# Patient Record
Sex: FEMALE | Race: WHITE | NOT HISPANIC OR LATINO | Employment: FULL TIME | URBAN - METROPOLITAN AREA
[De-identification: names, ages, dates, MRNs, and addresses within clinical notes are randomized per-mention and may not be internally consistent; named-entity substitution may affect disease eponyms.]

---

## 2018-05-28 ENCOUNTER — OFFICE VISIT (OUTPATIENT)
Dept: URGENT CARE | Facility: CLINIC | Age: 48
End: 2018-05-28
Payer: COMMERCIAL

## 2018-05-28 VITALS
SYSTOLIC BLOOD PRESSURE: 118 MMHG | DIASTOLIC BLOOD PRESSURE: 78 MMHG | HEIGHT: 63 IN | OXYGEN SATURATION: 99 % | WEIGHT: 133 LBS | HEART RATE: 83 BPM | BODY MASS INDEX: 23.57 KG/M2 | RESPIRATION RATE: 18 BRPM | TEMPERATURE: 97.6 F

## 2018-05-28 DIAGNOSIS — N39.0 URINARY TRACT INFECTION WITHOUT HEMATURIA, SITE UNSPECIFIED: Primary | ICD-10-CM

## 2018-05-28 LAB
SL AMB  POCT GLUCOSE, UA: ABNORMAL
SL AMB LEUKOCYTE ESTERASE,UA: ABNORMAL
SL AMB POCT BILIRUBIN,UA: ABNORMAL
SL AMB POCT BLOOD,UA: ABNORMAL
SL AMB POCT CLARITY,UA: CLEAR
SL AMB POCT COLOR,UA: YELLOW
SL AMB POCT KETONES,UA: ABNORMAL
SL AMB POCT NITRITE,UA: ABNORMAL
SL AMB POCT PH,UA: 6.5
SL AMB POCT SPECIFIC GRAVITY,UA: 1.05
SL AMB POCT URINE PROTEIN: ABNORMAL
SL AMB POCT UROBILINOGEN: 0.2

## 2018-05-28 PROCEDURE — 81002 URINALYSIS NONAUTO W/O SCOPE: CPT | Performed by: PHYSICIAN ASSISTANT

## 2018-05-28 PROCEDURE — 87086 URINE CULTURE/COLONY COUNT: CPT | Performed by: PHYSICIAN ASSISTANT

## 2018-05-28 PROCEDURE — G0382 LEV 3 HOSP TYPE B ED VISIT: HCPCS | Performed by: PHYSICIAN ASSISTANT

## 2018-05-28 RX ORDER — NITROFURANTOIN 25; 75 MG/1; MG/1
100 CAPSULE ORAL 2 TIMES DAILY
Qty: 14 CAPSULE | Refills: 0 | Status: SHIPPED | OUTPATIENT
Start: 2018-05-28 | End: 2018-06-04

## 2018-05-28 RX ORDER — LEVOTHYROXINE SODIUM 0.1 MG/1
100 TABLET ORAL DAILY
COMMUNITY

## 2018-05-28 NOTE — PROGRESS NOTES
[facility]        NAME: Richard Young is a 50 y o  female  : 1970    MRN: [id number]  DATE: May 28, 2018  TIME: 3:42 PM    Assessment and Plan   Urinary tract infection without hematuria, site unspecified [N39 0]  1  Urinary tract infection without hematuria, site unspecified  Urine culture    POCT urine dip    nitrofurantoin (MACROBID) 100 mg capsule         Patient Instructions     1  Urinary tract infection  -Urine culture is pending- I will call you if your antibiotic needs to be changed  -Take antibiotic as directed  -Increase fluids  -Use tylenol/motrin as needed  -Follow-up with PCP within 5-7 days    Go to ER with worsening symptoms, high fever, flank pain, vomiting, or any signs of distress    Chief Complaint     Chief Complaint   Patient presents with    Urinary Symptoms     onset Am today  - fever, + frequency, burning, pain         History of Present Illness       The patient presents today for an evaluation of urinary burning and urgency/frequency that started this morning  The patient denies taking any medications prior to arrival  She last had a UTI a few years ago and this feels similar  Review of Systems   Review of Systems   Constitutional: Negative for chills and fever  Gastrointestinal: Negative for abdominal pain, nausea and vomiting  Genitourinary: Positive for dysuria, frequency and urgency  Negative for flank pain  Musculoskeletal: Negative for back pain           Current Medications       Current Outpatient Prescriptions:     levothyroxine 100 mcg tablet, Take 100 mcg by mouth daily, Disp: , Rfl:     nitrofurantoin (MACROBID) 100 mg capsule, Take 1 capsule (100 mg total) by mouth 2 (two) times a day for 7 days, Disp: 14 capsule, Rfl: 0    Current Allergies     Allergies as of 2018    (No Known Allergies)            The following portions of the patient's history were reviewed and updated as appropriate: allergies, current medications, past family history, past medical history, past social history, past surgical history and problem list      No past medical history on file  No past surgical history on file  No family history on file  Medications have been verified  Objective   /78   Pulse 83   Temp 97 6 °F (36 4 °C) (Tympanic)   Resp 18   Ht 5' 3" (1 6 m)   Wt 60 3 kg (133 lb)   LMP  (LMP Unknown)   SpO2 99%   BMI 23 56 kg/m²        Physical Exam     Physical Exam   Constitutional: She is oriented to person, place, and time  She appears well-developed and well-nourished  No distress  Cardiovascular: Normal rate, regular rhythm and normal heart sounds  Pulmonary/Chest: Effort normal and breath sounds normal  She has no wheezes  She has no rales  Abdominal: There is no tenderness  There is no CVA tenderness  Neurological: She is alert and oriented to person, place, and time  Skin: Skin is warm and dry  Psychiatric: She has a normal mood and affect  Nursing note and vitals reviewed

## 2018-05-28 NOTE — PATIENT INSTRUCTIONS
1  Urinary tract infection  -Urine culture is pending- I will call you if your antibiotic needs to be changes  -Take antibiotic as directed  -Increase fluids  -Use tylenol/motrin as needed  -Follow-up with PCP within 5-7 days    Go to ER with worsening symptoms, high fever, flank pain, vomiting, or any signs of distress    Urinary Tract Infection in Women   AMBULATORY CARE:   A urinary tract infection (UTI)  is caused by bacteria that get inside your urinary tract  Most bacteria that enter your urinary tract come out when you urinate  If the bacteria stay in your urinary tract, you may get an infection  Your urinary tract includes your kidneys, ureters, bladder, and urethra  Urine is made in your kidneys, and it flows from the ureters to the bladder  Urine leaves the bladder through the urethra  A UTI is more common in your lower urinary tract, which includes your bladder and urethra  Common symptoms include the following:   · Urinating more often or waking from sleep to urinate    · Pain or burning when you urinate    · Pain or pressure in your lower abdomen     · Urine that smells bad    · Blood in your urine    · Leaking urine  Seek care immediately if:   · You are urinating very little or not at all  · You have a high fever with shaking chills  · You have side or back pain that gets worse  Contact your healthcare provider if:   · You have a fever  · You do not feel better after 2 days of taking antibiotics  · You are vomiting  · You have questions or concerns about your condition or care  Treatment for a UTI  may include medicines to treat a bacterial infection  You may also need medicines to decrease pain and burning, or decrease the urge to urinate often  Prevent a UTI:   · Empty your bladder often  Urinate and empty your bladder as soon as you feel the need  Do not hold your urine for long periods of time  · Wipe from front to back after you urinate or have a bowel movement  This will help prevent germs from getting into your urinary tract through your urethra  · Drink liquids as directed  Ask how much liquid to drink each day and which liquids are best for you  You may need to drink more liquids than usual to help flush out the bacteria  Do not drink alcohol, caffeine, or citrus juices  These can irritate your bladder and increase your symptoms  Your healthcare provider may recommend cranberry juice to help prevent a UTI  · Urinate after you have sex  This can help flush out bacteria passed during sex  · Do not douche or use feminine deodorants  These can change the chemical balance in your vagina  · Change sanitary pads or tampons often  This will help prevent germs from getting into your urinary tract  · Do pelvic muscle exercises often  Pelvic muscle exercises may help you start and stop urinating  Strong pelvic muscles may help you empty your bladder easier  Squeeze these muscles tightly for 5 seconds like you are trying to hold back urine  Then relax for 5 seconds  Gradually work up to squeezing for 10 seconds  Do 3 sets of 15 repetitions a day, or as directed  Follow up with your healthcare provider as directed:  Write down your questions so you remember to ask them during your visits  © 2017 [employer] Information is for End User's use only and may not be sold, redistributed or otherwise used for commercial purposes  All illustrations and images included in CareNotes® are the copyrighted property of [employer]  or Augie Rojas  The above information is an  only  It is not intended as medical advice for individual conditions or treatments  Talk to your doctor, nurse or pharmacist before following any medical regimen to see if it is safe and effective for you

## 2018-05-29 LAB — BACTERIA UR CULT: NORMAL

## 2019-08-06 ENCOUNTER — EVALUATION (OUTPATIENT)
Dept: PHYSICAL THERAPY | Age: 49
End: 2019-08-06
Payer: COMMERCIAL

## 2019-08-06 DIAGNOSIS — M25.511 PAIN IN JOINT OF RIGHT SHOULDER: Primary | ICD-10-CM

## 2019-08-06 DIAGNOSIS — M54.2 NECK PAIN: ICD-10-CM

## 2019-08-06 PROCEDURE — 97162 PT EVAL MOD COMPLEX 30 MIN: CPT | Performed by: PHYSICAL THERAPIST

## 2019-08-06 PROCEDURE — 97140 MANUAL THERAPY 1/> REGIONS: CPT | Performed by: PHYSICAL THERAPIST

## 2019-08-06 PROCEDURE — 97014 ELECTRIC STIMULATION THERAPY: CPT | Performed by: PHYSICAL THERAPIST

## 2019-08-06 PROCEDURE — 97110 THERAPEUTIC EXERCISES: CPT | Performed by: PHYSICAL THERAPIST

## 2019-08-06 NOTE — PROGRESS NOTES
PT Evaluation     Today's date: 2019  Patient name: Jc López  : 1970  MRN: [id number]  Referring provider: Ivelisse Delaney MD  Dx:   Encounter Diagnosis     ICD-10-CM    1  Pain in joint of right shoulder M25 511    2  Neck pain M54 2        Start Time: 0700  Stop Time: 0800  Total time in clinic (min): 60 minutes    Assessment  Assessment details: Jc López is a 44 y o  female who presents with pain, decreased strength, decreased ROM and postural  dysfunction  Due to these impairments, Patient has difficulty performing a/iadls  Patient's clinical presentation is consistent with their referring diagnosis of right RTC irritation, and PT diagnosis of neck pain with radiculopathy   Patient would benefit from skilled physical therapy to address their aforementioned impairments, improve their level of function and to improve their overall quality of life  Impairments: abnormal or restricted ROM, activity intolerance, impaired physical strength, lacks appropriate home exercise program, pain with function, poor posture  and poor body mechanics  Understanding of Dx/Px/POC: good   Prognosis: good    Goals  ST-3 WEEKS  1  Decrease pain in right shoulder < 5/10 on VAS at its worst   2   Increase ROM right shoulder by > 5 deg in all deficients planes  3   Increase strength right shoulder > 4-/5    4  Improve postural awareness  LT-6 WEEKS  1  Patient to be independent with a/iadls  2  Increase functional activities for leisure and home activities to previous LOF    3  Independent with HEP and/or fitness program     Plan  Patient would benefit from: skilled physical therapy  Planned modality interventions: cryotherapy, electrical stimulation/Russian stimulation, thermotherapy: hydrocollator packs and unattended electrical stimulation  Planned therapy interventions: activity modification, body mechanics training, aquatic therapy, flexibility, functional ROM exercises, home exercise program, IADL retraining, joint mobilization, manual therapy, neuromuscular re-education, patient education, postural training, strengthening, stretching, therapeutic activities and therapeutic exercise  Frequency: 2-3x week  Duration in weeks: 12  Plan of Care beginning date: 2019  Plan of Care expiration date: 2019  Treatment plan discussed with: patient        Subjective Evaluation    History of Present Illness  Mechanism of injury: Patient reports 2 months ago patient started with pain in right shoulder  She saw PCP had xrays: CA noted  Patient referred to ortho and dx of impingement  Patient seen at PT for one visit but had terrible pain after session  Patient saw a different PCP and an MRI was ordered for her neck and shoulder  Patient saw a different ortho and given a shot and referred to [facility] again  Patient report the injection helped a bit but using her arm increases her pain  Recurrent probem    Pain  Current pain ratin  At best pain ratin  At worst pain rating: 10  Location: right shoulder  Quality: dull ache  Aggravating factors: overhead activity  Progression: no change    Social Support    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)  Hand dominance: right      Diagnostic Tests  X-ray: abnormal  MRI studies: abnormal    FCE comments: MRI of neck patient stated showed HNP, and right shoulder showed tendonitis, AC jt DJD, and impingement  Treatments  Previous treatment: physical therapy and injection treatment  Patient Goals  Patient goals for therapy: decreased pain, increased motion, increased strength, independence with ADLs/IADLs and decreased edema          Objective     Static Posture     Head  Forward  Shoulders  Rounded      Postural Observations  Seated posture: fair  Standing posture: fair  Correction of posture: has no consistent effect        Neurological Testing     Sensation   Cervical/Thoracic     Right   Intact: light touch and hot/cold discrimination    Active Range of Motion Cervical/Thoracic Spine       Cervical    Flexion: 45 degrees   Extension: 70 degrees      Left lateral flexion: 32 degrees      Right lateral flexion: 28 degrees      Left rotation: 48 degrees  Right rotation: 62 degrees         Left Shoulder   Flexion: 160 degrees with pain  Abduction: 158 degrees with pain    Right Shoulder   Flexion: 96 degrees with pain  Abduction: 82 degrees with pain    Passive Range of Motion   Left Shoulder   Flexion: 155 degrees   Abduction: WFL  External rotation 90°: 72 degrees   Internal rotation 90°: 80 degrees     Right Shoulder   Flexion: 145 degrees with pain  Abduction: 115 degrees with pain  External rotation 90°: 65 degrees with pain  Internal rotation 90°: 60 degrees with pain    Strength/Myotome Testing   Cervical Spine   Neck extension: 4  Neck flexion: 4    Left Shoulder     Planes of Motion   Flexion: 4   Extension: 4-   Abduction: 4   Adduction: 4+   External rotation at 0°: 4   Internal rotation at 0°: 4+     Right Shoulder     Planes of Motion   Flexion: 2+   Abduction: 2+   Adduction: 3   External rotation at 0°: 3+   Internal rotation at 0°: 3+     Left Elbow   Flexion: 4+  Extension: 4+    Right Elbow   Flexion: 4  Extension: 4      Flowsheet Rows      Most Recent Value   PT/OT G-Codes   Current Score  41   Projected Score  64             Precautions: thyroid, HNP C/s  Modalities  8/6            MH/ES R shoulder 10                                           Manual  8/6            R shoulder 10'            neck 8'                                                       Exercise Diary  8/6            HEP 10'*                         sm ball closed chain FF/circles 20x                                      Pulleys nv                                                                                                                                                                                                    *Patient instructed in HEP: chin tucks, posture, pendulum and ice

## 2019-08-06 NOTE — LETTER
2019    MD LIANNA Gillis 1  [facility] [address] [place] [address]    Patient: Alexandra Gale   YOB: 1970   Date of Visit: 2019     Encounter Diagnosis     ICD-10-CM    1  Pain in joint of right shoulder M25 511    2  Neck pain M54 2        Dear Dr Gutierrez Simpler: Thank you for your recent referral of Alexandra Gale  Please review the attached evaluation summary from [facility] recent visit  Please verify that you agree with the plan of care by signing the attached order  If you have any questions or concerns, please do not hesitate to call  I sincerely appreciate the opportunity to share in the care of one of your patients and hope to have another opportunity to work with you in the near future  Sincerely,    Reyes Guy, PT      Referring Provider:      I certify that I have read the below Plan of Care and certify the need for these services furnished under this plan of treatment while under my care  MD LIANNA Gillis 1  [facility] [address]: [phone]          PT Evaluation     Today's date: 2019  Patient name: Alexandra Gale  : 1970  MRN: [id number]  Referring provider: Daimen Phillips MD  Dx:   Encounter Diagnosis     ICD-10-CM    1  Pain in joint of right shoulder M25 511                   Assessment  Assessment details: Alexandra Gale is a 44 y o  female who presents with pain, decreased strength, decreased ROM and postural  dysfunction  Due to these impairments, Patient has difficulty performing a/iadls  Patient's clinical presentation is consistent with their referring diagnosis of right RTC irritation, and PT diagnosis of neck pain with radiculopathy    Patient would benefit from skilled physical therapy to address their aforementioned impairments, improve their level of function and to improve their overall quality of life   Impairments: abnormal or restricted ROM, activity intolerance, impaired physical strength, lacks appropriate home exercise program, pain with function, poor posture  and poor body mechanics  Understanding of Dx/Px/POC: good   Prognosis: good    Goals  ST-3 WEEKS  1  Decrease pain in right shoulder < 5/10 on VAS at its worst   2   Increase ROM right shoulder by > 5 deg in all deficients planes  3   Increase strength right shoulder > 4-/5    4  Improve postural awareness  LT-6 WEEKS  1  Patient to be independent with a/iadls  2  Increase functional activities for leisure and home activities to previous LOF  3  Independent with HEP and/or fitness program     Plan  Patient would benefit from: skilled physical therapy  Planned modality interventions: cryotherapy, electrical stimulation/Russian stimulation, thermotherapy: hydrocollator packs and unattended electrical stimulation  Planned therapy interventions: activity modification, body mechanics training, aquatic therapy, flexibility, functional ROM exercises, home exercise program, IADL retraining, joint mobilization, manual therapy, neuromuscular re-education, patient education, postural training, strengthening, stretching, therapeutic activities and therapeutic exercise  Frequency: 2-3x week  Duration in weeks: 12  Plan of Care beginning date: 2019  Plan of Care expiration date: 2019  Treatment plan discussed with: patient        Subjective Evaluation    History of Present Illness  Mechanism of injury: Patient reports 2 months ago patient started with pain in right shoulder  She saw PCP had xrays: CA noted  Patient referred to ortho and dx of impingement  Patient seen at PT for one visit but had terrible pain after session  Patient saw a different PCP and an MRI was ordered for her neck and shoulder  Patient saw a different ortho and given a shot and referred to [facility]   Patient report the injection helped a bit but using her arm increases her pain  Recurrent probem    Pain  Current pain ratin  At best pain ratin  At worst pain rating: 10  Location: right shoulder  Quality: dull ache  Aggravating factors: overhead activity  Progression: no change    Social Support    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)  Hand dominance: right      Diagnostic Tests  X-ray: abnormal  MRI studies: abnormal    FCE comments: MRI of neck patient stated showed HNP, and right shoulder showed tendonitis, AC jt DJD, and impingement  Treatments  Previous treatment: physical therapy and injection treatment  Patient Goals  Patient goals for therapy: decreased pain, increased motion, increased strength, independence with ADLs/IADLs and decreased edema          Objective     Static Posture     Head  Forward  Shoulders  Rounded      Postural Observations  Seated posture: fair  Standing posture: fair  Correction of posture: has no consistent effect        Neurological Testing     Sensation   Cervical/Thoracic     Right   Intact: light touch and hot/cold discrimination    Active Range of Motion   Cervical/Thoracic Spine       Cervical    Flexion: 45 degrees   Extension: 70 degrees      Left lateral flexion: 32 degrees      Right lateral flexion: 28 degrees      Left rotation: 48 degrees  Right rotation: 62 degrees         Left Shoulder   Flexion: 160 degrees with pain  Abduction: 158 degrees with pain    Right Shoulder   Flexion: 96 degrees with pain  Abduction: 82 degrees with pain    Passive Range of Motion   Left Shoulder   Flexion: 155 degrees   Abduction: WFL  External rotation 90°:  72 degrees   Internal rotation 90°:  80 degrees     Right Shoulder   Flexion: 145 degrees with pain  Abduction: 115 degrees with pain  External rotation 90°:  65 degrees with pain  Internal rotation 90°:  60 degrees with pain    Strength/Myotome Testing   Cervical Spine   Neck extension: 4  Neck flexion: 4    Left Shoulder     Planes of Motion   Flexion: 4   Extension: 4- Abduction: 4   Adduction: 4+   External rotation at 0°:  4   Internal rotation at 0°:  4+     Right Shoulder     Planes of Motion   Flexion: 2+   Abduction: 2+   Adduction: 3   External rotation at 0°:  3+   Internal rotation at 0°:  3+     Left Elbow   Flexion: 4+  Extension: 4+    Right Elbow   Flexion: 4  Extension: 4             Precautions: thyroid, HNP C/s  Modalities  8/6            MH/ES R shoulder 10                                           Manual  8/6            R shoulder 10'            neck 8'                                                       Exercise Diary  8/6            HEP 10'*                         sm ball closed chain FF/circles 20x                                      Pulleys nv                                                                                                                                                                                                    *Patient instructed in HEP: chin tucks, posture, pendulum and ice

## 2019-08-07 ENCOUNTER — TRANSCRIBE ORDERS (OUTPATIENT)
Dept: PHYSICAL THERAPY | Age: 49
End: 2019-08-07

## 2019-08-07 DIAGNOSIS — M54.2 NECK PAIN: ICD-10-CM

## 2019-08-07 DIAGNOSIS — M25.511 PAIN IN JOINT OF RIGHT SHOULDER: Primary | ICD-10-CM

## 2019-08-08 ENCOUNTER — OFFICE VISIT (OUTPATIENT)
Dept: PHYSICAL THERAPY | Age: 49
End: 2019-08-08
Payer: COMMERCIAL

## 2019-08-08 DIAGNOSIS — M54.2 NECK PAIN: ICD-10-CM

## 2019-08-08 DIAGNOSIS — M25.511 PAIN IN JOINT OF RIGHT SHOULDER: Primary | ICD-10-CM

## 2019-08-08 PROCEDURE — 97014 ELECTRIC STIMULATION THERAPY: CPT | Performed by: PHYSICAL THERAPIST

## 2019-08-08 PROCEDURE — 97110 THERAPEUTIC EXERCISES: CPT | Performed by: PHYSICAL THERAPIST

## 2019-08-08 PROCEDURE — 97140 MANUAL THERAPY 1/> REGIONS: CPT | Performed by: PHYSICAL THERAPIST

## 2019-08-08 NOTE — PROGRESS NOTES
Daily Note     Today's date: 2019  Patient name: Alan Chi  : 1970  MRN: [id number]  Referring provider: Bibiana Cedillo MD  Dx:   Encounter Diagnosis     ICD-10-CM    1  Pain in joint of right shoulder M25 511    2  Neck pain M54 2        Start Time: 745  Stop Time: 1181  Total time in clinic (min): 50 minutes    Subjective: Patient reports she is feeling better overall, but this morning symptoms down RUE  Patient states she is trying to watch her posture  Objective: See treatment diary below      Assessment: Tolerated treatment well  Patient would benefit from continued PT Less guarding with RUE ROM and stretching  Patient felt better after session  Advised to ice and continue with HEP  Increased PROM right shoulder today  Plan: Continue per plan of care  Precautions: thyroid, HNP C/s  Modalities             MH/ES R shoulder 10 10                                          Manual             R shoulder 10' 10'            neck 8' 10                                                      Exercise Diary             HEP 10'*                         sm ball closed chain FF/circles 20x 20x           Chin tucks  15x                        Pulleys nv 20x           Ball isometrics shoulder x6  10xea           Ladder RUE  3x                                                                                                                                                                         *Patient instructed in HEP: chin tucks, posture, pendulum and ice 
Detail Level: Detailed

## 2019-08-12 ENCOUNTER — OFFICE VISIT (OUTPATIENT)
Dept: PHYSICAL THERAPY | Age: 49
End: 2019-08-12
Payer: COMMERCIAL

## 2019-08-12 DIAGNOSIS — M25.511 PAIN IN JOINT OF RIGHT SHOULDER: Primary | ICD-10-CM

## 2019-08-12 DIAGNOSIS — M54.2 NECK PAIN: ICD-10-CM

## 2019-08-12 PROCEDURE — 97140 MANUAL THERAPY 1/> REGIONS: CPT | Performed by: PHYSICAL THERAPIST

## 2019-08-12 PROCEDURE — 97110 THERAPEUTIC EXERCISES: CPT | Performed by: PHYSICAL THERAPIST

## 2019-08-12 PROCEDURE — 97014 ELECTRIC STIMULATION THERAPY: CPT | Performed by: PHYSICAL THERAPIST

## 2019-08-12 NOTE — PROGRESS NOTES
Daily Note     Today's date: 2019  Patient name: Zachary Us  : 1970  MRN: [id number]  Referring provider: Bobby Webster MD  Dx:   Encounter Diagnosis     ICD-10-CM    1  Pain in joint of right shoulder M25 511    2  Neck pain M54 2                   Subjective: Patient c/o pinching anterior right shoulder, c/o numbness in right hand upon waking  Able to lift her arm higher in front but not out to the side  Objective: See treatment diary below      Assessment: Tolerated treatment well  Patient would benefit from continued PT ROM right shoulder is doing well  Plan: Continue per plan of care  Precautions: thyroid, HNP C/s  Modalities            MH/ES R shoulder 10 10 10'                                         Manual            R shoulder 10' 10'  10          neck 8' 10 10                                                     Exercise Diary            HEP 10'*                         Cane FF   20x          Cane CP   20x          Shoulder stab circles   1# 20xea          sm ball closed chain FF/circles 20x 20x 30x          Chin tucks  15x 20x                       Pulleys nv 20x 30x          Ball isometrics shoulder x6  10xea 15xea          Ladder RUE  3x 3x          UBE   4'          Rows   25# 20x                                                                                                                                              *Patient instructed in HEP: chin tucks, posture, pendulum and ice

## 2019-08-15 ENCOUNTER — OFFICE VISIT (OUTPATIENT)
Dept: PHYSICAL THERAPY | Age: 49
End: 2019-08-15
Payer: COMMERCIAL

## 2019-08-15 DIAGNOSIS — M25.511 PAIN IN JOINT OF RIGHT SHOULDER: Primary | ICD-10-CM

## 2019-08-15 DIAGNOSIS — M54.2 NECK PAIN: ICD-10-CM

## 2019-08-15 PROCEDURE — 97110 THERAPEUTIC EXERCISES: CPT | Performed by: PHYSICAL THERAPIST

## 2019-08-15 PROCEDURE — 97014 ELECTRIC STIMULATION THERAPY: CPT | Performed by: PHYSICAL THERAPIST

## 2019-08-15 PROCEDURE — 97140 MANUAL THERAPY 1/> REGIONS: CPT | Performed by: PHYSICAL THERAPIST

## 2019-08-15 NOTE — PROGRESS NOTES
Daily Note     Today's date: 8/15/2019  Patient name: Yecenia Pierre  : 1970  MRN: [id number]  Referring provider: Lisadnro Duarte MD  Dx:   Encounter Diagnosis     ICD-10-CM    1  Pain in joint of right shoulder M25 511    2  Neck pain M54 2        Start Time: 845  Stop Time: 0940  Total time in clinic (min): 55 minutes    Subjective: Patient reports she is getting a pinching in front of right shoulder, and wakes up with her right hand numb  Patient to see MD tomorrow  Objective: See treatment diary below      Assessment: Tolerated treatment well  Patient would benefit from continued PT Patient able to lift her right arm higher and PROM is farther before the pain  Patient making good gains in PT  Possibly try Kinesiotape to anterior right shoulder next visit  Plan: Continue per plan of care  Precautions: thyroid, HNP C/s  Modalities  8/6 8/8 8/12 8/15         MH/ES R shoulder 10 10 10' 10                                        Manual  8/6 8/8 8/12 8/15         R shoulder 10' 10'  10 10         neck 8' 10 10 10                      NM re-ed    nv? Exercise Diary  8/6 8/8 8/12 8/15         HEP 10'*                         Cane FF   20x 30x         Cane CP   20x 30x         Shoulder stab circles   1# 20xea 2# 30x         sm ball closed chain FF/circles 20x 20x 30x lg ball 30x         Chin tucks  15x 20x home                      Pulleys nv 20x 30x 30x         Ball isometrics shoulder x6  10xea 15xea 20x ea         Ladder RUE  3x 3x 3x         UBE   4' 4'         Rows   25# 20x 25# 30x                      Neck concha    nv? *Patient instructed in HEP: chin tucks, posture, pendulum and ice

## 2019-08-19 ENCOUNTER — OFFICE VISIT (OUTPATIENT)
Dept: PHYSICAL THERAPY | Age: 49
End: 2019-08-19
Payer: COMMERCIAL

## 2019-08-19 DIAGNOSIS — M25.511 PAIN IN JOINT OF RIGHT SHOULDER: Primary | ICD-10-CM

## 2019-08-19 DIAGNOSIS — M54.2 NECK PAIN: ICD-10-CM

## 2019-08-19 PROCEDURE — 97112 NEUROMUSCULAR REEDUCATION: CPT | Performed by: PHYSICAL THERAPIST

## 2019-08-19 PROCEDURE — 97140 MANUAL THERAPY 1/> REGIONS: CPT | Performed by: PHYSICAL THERAPIST

## 2019-08-19 PROCEDURE — 97014 ELECTRIC STIMULATION THERAPY: CPT | Performed by: PHYSICAL THERAPIST

## 2019-08-19 PROCEDURE — 97110 THERAPEUTIC EXERCISES: CPT | Performed by: PHYSICAL THERAPIST

## 2019-08-19 NOTE — PROGRESS NOTES
Daily Note     Today's date: 2019  Patient name: Carmen Reyes  : 1970  MRN: [id number]  Referring provider: Mark Huddleston MD  Dx:   Encounter Diagnosis     ICD-10-CM    1  Pain in joint of right shoulder M25 511    2  Neck pain M54 2        Start Time: 910  Stop Time: 1020  Total time in clinic (min): 70 minutes    Subjective: Patient reports she saw MD last week and he wants to do 2 more weeks of PT and continue with anti inflammatories  Patient reports she gets the numbness in both hands at times, usually when sleeping  Objective: See treatment diary below      Assessment: Tolerated treatment well  Patient would benefit from continued PT Patient is making good gains in PT  Advised a contour pillow for sleeping, postural brace to wear, continue with ice and exercises  Added isometric neck extension to HEP  Plan: Continue per plan of care  Precautions: thyroid, HNP C/s  Modalities  8/6 8/8 8/12 8/15 8/19        MH/ES R shoulder 10 10 10' 10 10                                       Manual  8/6 8/8 8/12 8/15 8/19        R shoulder 10' 10'  10 10 10        neck 8' 10 10 10 10                     NM re-ed/kinesio -tape    nv?  Bicep 8'                         Exercise Diary  8/6 8/8 8/12 8/15 8/19        HEP 10'*                         Cane FF   20x 30x 1# 30x        Cane CP   20x 30x 1# 30x        Shoulder stab circles   1# 20xea 2# 30x 2# 30x        sm ball closed chain FF/circles 20x 20x 30x lg ball 30x 30x        Chin tucks  15x 20x home                      Pulleys nv 20x 30x 30x 30x        Ball isometrics shoulder x6  10xea 15xea 20x ea D/c to RTC        Ladder RUE  3x 3x 3x 3x        UBE   4' 4' 4'        Rows   25# 20x 25# 30x 25# 30x        RTC     20x ea        Neck concha    nv? extension 10x        triceps     35# 30x                                                                                                     *Patient instructed in HEP: chin tucks, posture, pendulum and ice

## 2019-08-22 ENCOUNTER — OFFICE VISIT (OUTPATIENT)
Dept: PHYSICAL THERAPY | Age: 49
End: 2019-08-22
Payer: COMMERCIAL

## 2019-08-22 DIAGNOSIS — M54.2 NECK PAIN: ICD-10-CM

## 2019-08-22 DIAGNOSIS — M25.511 PAIN IN JOINT OF RIGHT SHOULDER: Primary | ICD-10-CM

## 2019-08-22 PROCEDURE — 97112 NEUROMUSCULAR REEDUCATION: CPT | Performed by: PHYSICAL THERAPIST

## 2019-08-22 PROCEDURE — 97110 THERAPEUTIC EXERCISES: CPT | Performed by: PHYSICAL THERAPIST

## 2019-08-22 PROCEDURE — 97014 ELECTRIC STIMULATION THERAPY: CPT | Performed by: PHYSICAL THERAPIST

## 2019-08-22 PROCEDURE — 97140 MANUAL THERAPY 1/> REGIONS: CPT | Performed by: PHYSICAL THERAPIST

## 2019-08-22 NOTE — PROGRESS NOTES
Daily Note     Today's date: 2019  Patient name: Laxmi Lu  : 1970  MRN: [id number]  Referring provider: Junior Cantu MD  Dx:   Encounter Diagnosis     ICD-10-CM    1  Pain in joint of right shoulder M25 511    2  Neck pain M54 2        Start Time: 1010  Stop Time: 1120  Total time in clinic (min): 70 minutes    Subjective: Patient reports she is sore in upper back, patient also stated the tape helped her shoulder  Objective: See treatment diary below      Assessment: Tolerated treatment well  Patient would benefit from continued PT Pain end range with decreased full ROM right shoulder Flexion, ER and IR  Patient sleeping with contour pillow and less numbness in hands in the morning  Plan: Continue per plan of care  Precautions: thyroid, HNP C/s  Modalities  8/6 8/8 8/12 8/15 8/19 8/22       MH/ES R shoulder 10 10 10' 10 10 10                                      Manual  8/6 8/8 8/12 8/15 8/19 8/22       R shoulder 10' 10'  10 10 10 10       neck 8' 10 10 10 10 10                    NM re-ed/kinesio -tape    nv? Bicep 8 bicep                        Exercise Diary  8/6 8/8 8/12 8/15 8/19 8/22       HEP 10'*            CP+      1# 30x       Cane FF   20x 30x 1# 30x 1# 30x       Cane CP   20x 30x 1# 30x 1# 30x       Shoulder stab circles   1# 20xea 2# 30x 2# 30x 2# 30x       sm ball closed chain FF/circles 20x 20x 30x lg ball 30x 30x 30xea       Chin tucks  15x 20x home                      Pulleys nv 20x 30x 30x 30x 30x       Ball isometrics shoulder x6  10xea 15xea 20x ea D/c to RTC        Ladder RUE  3x 3x 3x 3x 3x       UBE   4' 4' 4' 4'       Rows   25# 20x 25# 30x 25# 30x 25# 30x       RTC     20x ea 30x       Neck concha    nv? extension 10x home       triceps     35# 30x 35# 30x                                                                                                    *Patient instructed in HEP: chin tucks, posture, pendulum and ice

## 2019-08-26 ENCOUNTER — OFFICE VISIT (OUTPATIENT)
Dept: PHYSICAL THERAPY | Age: 49
End: 2019-08-26
Payer: COMMERCIAL

## 2019-08-26 DIAGNOSIS — M25.511 PAIN IN JOINT OF RIGHT SHOULDER: Primary | ICD-10-CM

## 2019-08-26 DIAGNOSIS — M54.2 NECK PAIN: ICD-10-CM

## 2019-08-26 PROCEDURE — 97110 THERAPEUTIC EXERCISES: CPT | Performed by: PHYSICAL THERAPIST

## 2019-08-26 PROCEDURE — 97140 MANUAL THERAPY 1/> REGIONS: CPT | Performed by: PHYSICAL THERAPIST

## 2019-08-26 PROCEDURE — 97014 ELECTRIC STIMULATION THERAPY: CPT | Performed by: PHYSICAL THERAPIST

## 2019-08-26 PROCEDURE — 97112 NEUROMUSCULAR REEDUCATION: CPT | Performed by: PHYSICAL THERAPIST

## 2019-08-26 NOTE — PROGRESS NOTES
Daily Note     Today's date: 2019  Patient name: Hannah De  : 1970  MRN: [id number]  Referring provider: Kd Amato MD  Dx:   Encounter Diagnosis     ICD-10-CM    1  Pain in joint of right shoulder M25 511    2  Neck pain M54 2        Start Time: 815  Stop Time: 915  Total time in clinic (min): 60 minutes    Subjective: Patient reports she had a bad weekend, she feels she is going backwards  Patient pulled some weeds but that was all  Wakes up with numbness and has weakness in RUE during the day  Objective: See treatment diary below      Assessment: Tolerated treatment fair  Patient would benefit from continued PT Patient able to stretch RUE farther if she performs a chin tuck while stretching  Advised patient to continue to ice and to perform chin tucks  Plan: Continue per plan of care  Precautions: thyroid, HNP C/s  Modalities  8/6 8/8 8/12 8/15 8/19 8/22 8/26      MH/ES R shoulder 10 10 10' 10 10 10 10                                     Manual  8/6 8/8 8/12 8/15 8/19 8/22 8/26      R shoulder 10' 8'  10 10 10 10 10      neck 8' 10 10 10 10 10 10                   NM re-ed/kinesio -tape    nv?  Bicep 8 bicep 8 bicep & impingment                       Exercise Diary  8/6 8/8 8/12 8/15 8/19 8/22 8/26      HEP 10'*            CP+      1# 30x 1# 30x      Cane FF   20x 30x 1# 30x 1# 30x 1# 30x      Cane CP   20x 30x 1# 30x 1# 30x 1# 30x      Shoulder stab circles   1# 20xea 2# 30x 2# 30x 2# 30x 2# 30x      sm ball closed chain FF/circles 20x 20x 30x lg ball 30x 30x 30xea 30x      Chin tucks  15x 20x home                      Pulleys nv 20x 30x 30x 30x 30x 30x                   Ladder RUE  3x 3x 3x 3x 3x 3x      UBE   4' 4' 4' 4' 4'      Rows   25# 20x 25# 30x 25# 30x 25# 30x 25# 30x      RTC     20x ea 30x 30x      Neck concha    nv? extension 10x home       triceps     35# 30x 35# 30x 35# 30x *Patient instructed in HEP: chin tucks, posture, pendulum and ice

## 2019-08-29 ENCOUNTER — OFFICE VISIT (OUTPATIENT)
Dept: PHYSICAL THERAPY | Age: 49
End: 2019-08-29
Payer: COMMERCIAL

## 2019-08-29 DIAGNOSIS — M25.511 PAIN IN JOINT OF RIGHT SHOULDER: Primary | ICD-10-CM

## 2019-08-29 DIAGNOSIS — M54.2 NECK PAIN: ICD-10-CM

## 2019-08-29 PROCEDURE — 97014 ELECTRIC STIMULATION THERAPY: CPT | Performed by: PHYSICAL THERAPIST

## 2019-08-29 PROCEDURE — 97110 THERAPEUTIC EXERCISES: CPT | Performed by: PHYSICAL THERAPIST

## 2019-08-29 PROCEDURE — 97140 MANUAL THERAPY 1/> REGIONS: CPT | Performed by: PHYSICAL THERAPIST

## 2019-08-29 NOTE — PROGRESS NOTES
Daily Note     Today's date: 2019  Patient name: Sylvain Wade  : 1970  MRN: [id number]  Referring provider: Dionna Lawler MD  Dx:   Encounter Diagnosis     ICD-10-CM    1  Pain in joint of right shoulder M25 511    2  Neck pain M54 2        Start Time: 0700  Stop Time: 0745  Total time in clinic (min): 45 minutes    Subjective: Patient reports she is "going backwards"  Patient stated pain down right arm with decreased ROM  No numbness in hands anymore however  Tape helped a little she feels  Patient to see MD tomorrow for shoulder  Patient states she has an appt with pain management at [facility] next week  Patient feels upset that she isn't better  Objective: See treatment diary below      Assessment: Tolerated treatment fair  Patient has pain with guarding at end range right shoulder all motions  PROM right shoulder Flexion 130, abd 120, ER 65, IR 75  Patient has rounder shoulders and forward head with small dowager's hump  Patient is working on her posture with cues and exercises at home  Patient was doing better until this week  She has follow up with Ortho tomorrow  Patient has large copayments and is not currently working  Plan: Hold PT until after MD appts  Precautions: thyroid, HNP C/s  Modalities  8/6 8/8 8/12 8/15 8/19 8/22 8/26 8/29     MH/ES R shoulder 10 10 10' 10 10 10 10 10'                                    Manual  8/6 8/8 8/12 8/15 8/19 8/22 8/26 8/29     R shoulder 10' 10'  10 10 10 10 10 10     neck 8' 10 10 10 10 10 10 10                  NM re-ed/kinesio -tape    nv?  Bicep 8' bicep 8' bicep & impingment nt                      Exercise Diary  8/6 8/8 8/12 8/15 8/19 8/22 8/26 8/29     HEP 10'*            CP+      1# 30x 1# 30x 30x     Cane FF   20x 30x 1# 30x 1# 30x 1# 30x 30x     Cane CP   20x 30x 1# 30x 1# 30x 1# 30x 30x     Shoulder stab circles   1# 20xea 2# 30x 2# 30x 2# 30x 2# 30x 30x     sm ball closed chain FF/circles 20x 20x 30x lg ball 30x 30x 30xea 30x 30x Chin tucks  15x 20x home                      Pulleys nv 20x 30x 30x 30x 30x 30x nt                  Ladder RUE  3x 3x 3x 3x 3x 3x nt     UBE   4' 4' 4' 4' 4' nt     Rows   25# 20x 25# 30x 25# 30x 25# 30x 25# 30x nt     RTC     20x ea 30x 30x nt     Neck concha    nv? extension 10x home  nt     triceps     35# 30x 35# 30x 35# 30x nt                                                                                                  *Patient instructed in HEP: chin tucks, posture, pendulum and ice

## 2019-09-17 NOTE — PROGRESS NOTES
Spoke with patient today  She stated she was told by ortho to stop therapy  She saw a neurologist and having surgery on Monday for disc replacement in her neck  D/c PT at this time

## 2020-01-22 ENCOUNTER — EVALUATION (OUTPATIENT)
Dept: PHYSICAL THERAPY | Age: 50
End: 2020-01-22
Payer: COMMERCIAL

## 2020-01-22 DIAGNOSIS — G89.29 CHRONIC RIGHT SHOULDER PAIN: Primary | ICD-10-CM

## 2020-01-22 DIAGNOSIS — M25.511 CHRONIC RIGHT SHOULDER PAIN: Primary | ICD-10-CM

## 2020-01-22 PROCEDURE — 97140 MANUAL THERAPY 1/> REGIONS: CPT | Performed by: PHYSICAL THERAPIST

## 2020-01-22 PROCEDURE — 97110 THERAPEUTIC EXERCISES: CPT | Performed by: PHYSICAL THERAPIST

## 2020-01-22 PROCEDURE — 97162 PT EVAL MOD COMPLEX 30 MIN: CPT | Performed by: PHYSICAL THERAPIST

## 2020-01-22 NOTE — PROGRESS NOTES
PT Evaluation     Today's date: 2020  Patient name: Jayce Damon  : 1970  MRN: [id number]  Referring provider: Mckinley Pineda MD  Dx:   Encounter Diagnosis     ICD-10-CM    1  Chronic right shoulder pain M25 511     G89 29                   Assessment  Assessment details: Jayce Damon is a 44 y o  female who presents with right shoulder pain, decreased right UE strength, decreased A/PROM and decreased joint mobility  Due to these impairments, Patient has difficulty performing a/iadls and recreational activities  Patient's clinical presentation is consistent with the referring diagnosis of right shoulder pain and adhesive capsulitis s/p manipulation under anesthesia  Patient would benefit from skilled physical therapy to address the aforementioned impairments, improve her level of function and to improve her overall quality of life  Impairments: abnormal or restricted ROM, activity intolerance, impaired physical strength, lacks appropriate home exercise program and pain with function  Functional limitations: can not use right arm for functional activities, immoblized in sling due to nerve block, pt is right handedUnderstanding of Dx/Px/POC: good   Prognosis: good    Goals  ST-3 WEEKS  1  Decrease pain to < 5/10 at its worst on VAS  2   Increase ROM of the right shoulder equivalent to left  3   Increase UE by 1/2 MMT grade in all deficient planes  4  Independent HEP for daily performance  LT-6 WEEKS  1  Patient to be independent with adls using right hand including combing her hair and self cleansing/toileting  2  Full A/PROM of the right shoulder  3  Strength 4/5 or greater      Plan  Plan details: Due to large copay pt will be seen 5x/week but 2 visits will be NC   Patient would benefit from: skilled physical therapy  Planned modality interventions: cryotherapy, thermotherapy: hydrocollator packs and unattended electrical stimulation  Planned therapy interventions: functional ROM exercises, home exercise program, joint mobilization, manual therapy, neuromuscular re-education, patient education, strengthening, stretching, therapeutic activities and therapeutic exercise  Frequency: 5x/week for 2 weeks, then 2-3x week  Duration in weeks: 8  Plan of Care beginning date: 2020  Plan of Care expiration date: 2020  Treatment plan discussed with: patient        Subjective Evaluation    History of Present Illness  Mechanism of injury: Pt had right shoulder pain for several months but became more constant 2019  She had prior PT without relief  Pt was then referred to a neurosurgeon who felt she needed a cervical disc replacement  She underwent surgery   Post surgery no relief for right shoulder pain  She was then referred to Dr Isaak Raya for a second opinion, He felt hte pain ws being caused by her shoulder  She then saw Dr Nelly Fuller who diagnosed her with adhesive capsulitis  She underwent manipulation or the right shoulder today  She is to have PT daily for 2 weeks and then returns to Dr Nelly Fuller     Pain  Current pain ratin (nerve block present)  At worst pain ratin  Location: right sholder radiates to elbow  Quality: radiating and sharp    Hand dominance: right    Treatments  Previous treatment: physical therapy  Patient Goals  Patient goals for therapy: decreased pain, increased motion, increased strength and independence with ADLs/IADLs  Patient goal: increase        Objective     Observations     Additional Observation Details  Immobilized in sling    Neurological Testing     Additional Neurological Details  Pt is completely numb today during evaluation due to nerve block present    Passive Range of Motion   Left Shoulder   Flexion: 176 degrees   Abduction: 175 degrees   External rotation 90°: 87 degrees   Internal rotation 90°: 62 degrees     Right Shoulder   Flexion: 175 degrees   Abduction: 172 degrees   External rotation 90°: 90 degrees   Internal rotation 90°: 69 degrees     Strength/Myotome Testing     Left Shoulder   Normal muscle strength    Additional Strength Details  Right shoulder not assessed due to nerve block    Shoulder shrug 3/5    General Comments:      Shoulder Comments   Thumb in IP flexion with possible trigger, able to move passively to neutral with popping but due to nerve block with return to IP flexed postion             Precautions: standard      Manual  1/22            Right shoulder, UE 15                                                                    Exercise Diary  1/22            Pendulums CW/CCW 10x ea            Cane FF 10x assist  hold cane            Cane ABD 10x A            Cane ER 10x A            Ball closed chain FF with trunk lean 10x  blue            Ball closed chain circles CW/CCW 10xea blue                         puleys FF/POS 10x ea                                                                             HEP/ pt ed 5'                                                                                          HEP- pendulums, cane FF/ABD/ER, pulleys, table slides 3-5x day     Modalities  1/22            Ice post 10'            Ice/MH ES prn NT

## 2020-01-22 NOTE — LETTER
2020    [facility] [address] [facility]    Patient: German Dawkins   YOB: 1970   Date of Visit: 2020     Encounter Diagnosis     ICD-10-CM    1  Chronic right shoulder pain M25 511     G89 29        Dear Dr Ten Izaguirre:    Thank you for your recent referral of German Dawkins  Please review the attached evaluation summary from [facility] recent visit  Please verify that you agree with the plan of care by signing the attached order  If you have any questions or concerns, please do not hesitate to call  I sincerely appreciate the opportunity to share in the care of one of your patients and hope to have another opportunity to work with you in the near future  Sincerely,    Lawyer Sierra, PT      Referring Provider:      I certify that I have read the below Plan of Care and certify the need for these services furnished under this plan of treatment while under my care  Donny Arceo MD  [facility] 31: [phone]          PT Evaluation     Today's date: 2020  Patient name: German Dawkins  : 1970  MRN: [id number]  Referring provider: Travis Zambrano MD  Dx:   Encounter Diagnosis     ICD-10-CM    1  Chronic right shoulder pain M25 511     G89 29                   Assessment  Assessment details: German Dawkins is a 44 y o  female who presents with right shoulder pain, decreased right UE strength, decreased A/PROM and decreased joint mobility  Due to these impairments, Patient has difficulty performing a/iadls and recreational activities  Patient's clinical presentation is consistent with the referring diagnosis of right shoulder pain and adhesive capsulitis s/p manipulation under anesthesia   Patient would benefit from skilled physical therapy to address the aforementioned impairments, improve her level of function and to improve her overall quality of life   Impairments: abnormal or restricted ROM, activity intolerance, impaired physical strength, lacks appropriate home exercise program and pain with function  Functional limitations: can not use right arm for functional activities, immoblized in sling due to nerve block, pt is right handedUnderstanding of Dx/Px/POC: good   Prognosis: good    Goals  ST-3 WEEKS  1  Decrease pain to < 5/10 at its worst on VAS  2   Increase ROM of the right shoulder equivalent to left  3   Increase UE by 1/2 MMT grade in all deficient planes  4  Independent HEP for daily performance  LT-6 WEEKS  1  Patient to be independent with adls using right hand including combing her hair and self cleansing/toileting  2  Full A/PROM of the right shoulder  3  Strength 4/5 or greater  Plan  Plan details: Due to large copay pt will be seen 5x/week but 2 visits will be NC   Patient would benefit from: skilled physical therapy  Planned modality interventions: cryotherapy, thermotherapy: hydrocollator packs and unattended electrical stimulation  Planned therapy interventions: functional ROM exercises, home exercise program, joint mobilization, manual therapy, neuromuscular re-education, patient education, strengthening, stretching, therapeutic activities and therapeutic exercise  Frequency: 5x/week for 2 weeks, then 2-3x week  Duration in weeks: 8  Plan of Care beginning date: 2020  Plan of Care expiration date: 2020  Treatment plan discussed with: patient        Subjective Evaluation    History of Present Illness  Mechanism of injury: Pt had right shoulder pain for several months but became more constant 2019  She had prior PT without relief  Pt was then referred to a neurosurgeon who felt she needed a cervical disc replacement  She underwent surgery   Post surgery no relief for right shoulder pain  She was then referred to Dr Isaak Raya for a second opinion, He felt hte pain ws being caused by her shoulder   She then saw Dr Sonu Pederson who diagnosed her with adhesive capsulitis  She underwent manipulation or the right shoulder today  She is to have PT daily for 2 weeks and then returns to Dr Sonu Pederson     Pain  Current pain ratin (nerve block present)  At worst pain ratin  Location: right sholder radiates to elbow  Quality: radiating and sharp    Hand dominance: right    Treatments  Previous treatment: physical therapy  Patient Goals  Patient goals for therapy: decreased pain, increased motion, increased strength and independence with ADLs/IADLs  Patient goal: increase        Objective     Observations     Additional Observation Details  Immobilized in sling    Neurological Testing     Additional Neurological Details  Pt is completely numb today during evaluation due to nerve block present    Passive Range of Motion   Left Shoulder   Flexion: 176 degrees   Abduction: 175 degrees   External rotation 90°:  87 degrees   Internal rotation 90°:  62 degrees     Right Shoulder   Flexion: 175 degrees   Abduction: 172 degrees   External rotation 90°:  90 degrees   Internal rotation 90°:  69 degrees     Strength/Myotome Testing     Left Shoulder   Normal muscle strength    Additional Strength Details  Right shoulder not assessed due to nerve block    Shoulder shrug 3/5    General Comments:      Shoulder Comments   Thumb in IP flexion with possible trigger, able to move passively to neutral with popping but due to nerve block with return to IP flexed postion             Precautions: standard      Manual              Right shoulder, UE 15                                                                    Exercise Diary              Pendulums CW/CCW 10x ea            Cane FF 10x assist  hold cane            Cane ABD 10x A            Cane ER 10x A            Ball closed chain FF with trunk lean 10x  blue            Ball closed chain circles CW/CCW 10xea blue                         puleys FF/POS 10x ea HEP/ pt ed 5'                                                                                          HEP- pendulums, cane FF/ABD/ER, pulleys, table slides 3-5x day     Modalities  1/22            Ice post 10'            Ice/MH ES prn NT

## 2020-01-23 ENCOUNTER — OFFICE VISIT (OUTPATIENT)
Dept: PHYSICAL THERAPY | Age: 50
End: 2020-01-23
Payer: COMMERCIAL

## 2020-01-23 ENCOUNTER — TRANSCRIBE ORDERS (OUTPATIENT)
Dept: PHYSICAL THERAPY | Age: 50
End: 2020-01-23

## 2020-01-23 DIAGNOSIS — M25.511 CHRONIC RIGHT SHOULDER PAIN: Primary | ICD-10-CM

## 2020-01-23 DIAGNOSIS — M25.511 RIGHT SHOULDER PAIN, UNSPECIFIED CHRONICITY: Primary | ICD-10-CM

## 2020-01-23 DIAGNOSIS — M25.511 PAIN IN JOINT OF RIGHT SHOULDER: ICD-10-CM

## 2020-01-23 DIAGNOSIS — G89.29 CHRONIC RIGHT SHOULDER PAIN: Primary | ICD-10-CM

## 2020-01-23 PROCEDURE — 97014 ELECTRIC STIMULATION THERAPY: CPT | Performed by: PHYSICAL THERAPIST

## 2020-01-23 PROCEDURE — 97140 MANUAL THERAPY 1/> REGIONS: CPT | Performed by: PHYSICAL THERAPIST

## 2020-01-23 PROCEDURE — 97110 THERAPEUTIC EXERCISES: CPT | Performed by: PHYSICAL THERAPIST

## 2020-01-23 NOTE — PROGRESS NOTES
Daily Note     Today's date: 2020  Patient name: Mariela Sanders  : 1970  MRN: [id number]  Referring provider: Tammy Holman MD  Dx:   Encounter Diagnosis     ICD-10-CM    1  Chronic right shoulder pain M25 511     G89 29    2  Pain in joint of right shoulder M25 511        Start Time: 1430  Stop Time: 1530  Total time in clinic (min): 60 minutes    Subjective: Patient reports the pain medication is not really working for her  Patient did not take anything prior to PT today due to had to drive herself  C/o pain 5/10  Patient states she feels like she needs the support of the sling  Objective: See treatment diary below      Assessment: Tolerated treatment fair  Patient would benefit from continued PT Patient wearing sling  Advised patient remove and only wear if needed  Instructed patient in HEP in sitting, supine and standing for PROM, AAROM  Patient needed support to lift RUE, c/o pain with stretching end range but loose end feel  Weakness right shld and scapular girdle  PROM FF: 160, abd (POS) 170, true abd 140, ER @90: 56, IR @ 90: 75      Plan: Continue per plan of care        Precautions: standard      Manual             Right shoulder, UE 15 20'                                                                   Exercise Diary             Pendulums CW/CCW 10x ea 20x           Cane FF 10x assist  hold cane 20x A           Cane ABD 10x A 20x A           Cane ER 10x A 20x           Cane CP  20x A                        Ball closed chain FF with trunk lean 10x  blue 30x           Ball closed chain circles CW/CCW 10xea blue 30x                        puleys FF/POS 10x ea 30x                                                                            HEP/ pt ed 5' 10'                                                                                         HEP- pendulums, cane FF/ABD/ER, pulleys, table slides 3-5x day     Modalities             Ice post 10' @ home Ice/DANA ES prn NT 10'

## 2020-01-24 ENCOUNTER — OFFICE VISIT (OUTPATIENT)
Dept: PHYSICAL THERAPY | Age: 50
End: 2020-01-24
Payer: COMMERCIAL

## 2020-01-24 DIAGNOSIS — G89.29 CHRONIC RIGHT SHOULDER PAIN: Primary | ICD-10-CM

## 2020-01-24 DIAGNOSIS — M25.511 CHRONIC RIGHT SHOULDER PAIN: Primary | ICD-10-CM

## 2020-01-24 PROCEDURE — 97110 THERAPEUTIC EXERCISES: CPT | Performed by: PHYSICAL THERAPIST

## 2020-01-24 PROCEDURE — 97014 ELECTRIC STIMULATION THERAPY: CPT | Performed by: PHYSICAL THERAPIST

## 2020-01-24 PROCEDURE — 97140 MANUAL THERAPY 1/> REGIONS: CPT | Performed by: PHYSICAL THERAPIST

## 2020-01-24 NOTE — PROGRESS NOTES
Daily Note     Today's date: 2020  Patient name: Rosmery Hamlin  : 1970  MRN: [id number]  Referring provider: Melony Salvador MD  Dx:   Encounter Diagnosis     ICD-10-CM    1  Chronic right shoulder pain M25 511     G89 29        Start Time: 915  Stop Time: 1010  Total time in clinic (min): 55 minutes    Subjective: Pt reports pain 2/10 prior to PT session  Pt did take her pain meds today prior to appt  Objective: See treatment diary below      ER 90    Assessment: Tolerated treatment fairly well    Patient at end ranges  ROM improved comared to yesterday, may be related to pt taking pain meds prior to session  Reviewed HEP and stressed importance of performing over the weekend  Plan: Continue per plan of care        Precautions: standard      Manual            Right shoulder, UE 15 20' 20                                                                  Exercise Diary            Pendulums CW/CCW 10x ea 20x 30x          Cane FF 10x assist  hold cane 20x A 30x          Cane ABD 10x A 20x A 30x          Cane ER 10x A 20x 30x          Cane CP  20x A 30x                       Ball closed chain FF with trunk lean 10x  blue 30x 30x          Ball closed chain circles CW/CCW 10xea blue 30x 30x          UBE   4'          puleys FF/POS 10x ea 30x 20x          ladder   3x                                                              HEP/ pt ed 5' 10'                                                                                         HEP- pendulums, cane FF/ABD/ER, pulleys, table slides 3-5x day     Modalities            Ice post 10' @ home           Ice/ ES prn NT 10' 10

## 2020-01-27 ENCOUNTER — OFFICE VISIT (OUTPATIENT)
Dept: PHYSICAL THERAPY | Age: 50
End: 2020-01-27
Payer: COMMERCIAL

## 2020-01-27 DIAGNOSIS — M25.511 CHRONIC RIGHT SHOULDER PAIN: Primary | ICD-10-CM

## 2020-01-27 DIAGNOSIS — M25.511 PAIN IN JOINT OF RIGHT SHOULDER: ICD-10-CM

## 2020-01-27 DIAGNOSIS — G89.29 CHRONIC RIGHT SHOULDER PAIN: Primary | ICD-10-CM

## 2020-01-27 PROCEDURE — 97014 ELECTRIC STIMULATION THERAPY: CPT | Performed by: PHYSICAL THERAPIST

## 2020-01-27 PROCEDURE — 97110 THERAPEUTIC EXERCISES: CPT | Performed by: PHYSICAL THERAPIST

## 2020-01-27 PROCEDURE — 97140 MANUAL THERAPY 1/> REGIONS: CPT | Performed by: PHYSICAL THERAPIST

## 2020-01-27 NOTE — PROGRESS NOTES
Daily Note     Today's date: 2020  Patient name: German Dawkins  : 1970  MRN: [id number]  Referring provider: Travis Zambrano MD  Dx:   Encounter Diagnosis     ICD-10-CM    1  Chronic right shoulder pain M25 511     G89 29    2  Pain in joint of right shoulder M25 511                   Subjective: Pt reports right shoulder pain 5/10 with meds  She is having pain extending down to arm to thumb and c/o numbness in all digits during the exercises at home  Objective: See treatment diary below      Assessment: Tolerated treatment fairly well  Patient PROM>AROM  Pain with ER>IR  Plan: Continue per plan of care        Precautions: standard      Manual           Right shoulder, UE 15 20' 20 20                                                                 Exercise Diary           Pendulums CW/CCW 10x ea 20x 30x 30x         Cane FF 10x assist  hold cane 20x A 30x 30x         Cane ABD 10x A 20x A 30x 30x         Cane ER 10x A 20x 30x 30x         Cane CP  20x A 30x 30x                      Ball closed chain FF with trunk lean 10x  blue 30x 30x 30x         Ball closed chain circles CW/CCW 10xea blue 30x 30x 30x         UBE   4' 5'         puleys FF/POS 10x ea 30x 20x 30x         ladder   3x 5x         RTC isos 6planes    15x         Tband row    30x                                   HEP/ pt ed 5' 10'                                                                                         HEP- pendulums, cane FF/ABD/ER, pulleys, table slides 3-5x day     Modalities            Ice post 10' @ home           Ice/ ES prn NT 10' 10

## 2020-01-28 ENCOUNTER — APPOINTMENT (OUTPATIENT)
Dept: PHYSICAL THERAPY | Age: 50
End: 2020-01-28
Payer: COMMERCIAL

## 2020-01-29 ENCOUNTER — OFFICE VISIT (OUTPATIENT)
Dept: PHYSICAL THERAPY | Age: 50
End: 2020-01-29
Payer: COMMERCIAL

## 2020-01-29 DIAGNOSIS — M25.511 PAIN IN JOINT OF RIGHT SHOULDER: ICD-10-CM

## 2020-01-29 DIAGNOSIS — M25.511 CHRONIC RIGHT SHOULDER PAIN: Primary | ICD-10-CM

## 2020-01-29 DIAGNOSIS — G89.29 CHRONIC RIGHT SHOULDER PAIN: Primary | ICD-10-CM

## 2020-01-29 PROCEDURE — 97140 MANUAL THERAPY 1/> REGIONS: CPT | Performed by: PHYSICAL THERAPIST

## 2020-01-29 PROCEDURE — 97014 ELECTRIC STIMULATION THERAPY: CPT | Performed by: PHYSICAL THERAPIST

## 2020-01-29 PROCEDURE — 97110 THERAPEUTIC EXERCISES: CPT | Performed by: PHYSICAL THERAPIST

## 2020-01-29 NOTE — PROGRESS NOTES
Daily Note     Today's date: 2020  Patient name: Ricardo Webb  : 1970  MRN: [id number]  Referring provider: Jeannette Hardin MD  Dx:   Encounter Diagnosis     ICD-10-CM    1  Chronic right shoulder pain M25 511     G89 29    2  Pain in joint of right shoulder M25 511        Start Time: 1605  Stop Time: 1655  Total time in clinic (min): 50 minutes    Subjective: Pt reports pain 5/10 prior to pain pill  Pt able to shave underarm today for first time in months  Pt also now notes she can wash her hair with her right hand  "Stretches are not hurting as bad"  Objective: See treatment diary below  Modified treatment due to pt time constraints  Assessment: Tolerated treatment well  Patient ROM improved with end range pain/tightness  Improved functional use of right arm as noted above  Pt compliant with HEP  Plan: Continue per plan of care        Precautions: standard      Manual          Right shoulder, UE 15 20' 20 20 20                                                                Exercise Diary          Pendulums CW/CCW 10x ea 20x 30x 30x 30x        Cane FF 10x assist  hold cane 20x A 30x 30x 30x        Cane ABD 10x A 20x A 30x 30x 30x        Cane ER 10x A 20x 30x 30x 30x        Cane CP  20x A 30x 30x 30x                     Ball closed chain FF with trunk lean 10x  blue 30x 30x 30x 30x        Ball closed chain circles CW/CCW 10xea blue 30x 30x 30x 30x        UBE   4' 5' 6'        puleys FF/POS 10x ea 30x 20x 30x 30x        ladder   3x 5x NT 2° time        RTC isos 6planes    15x NT  time        Tband row    30x NT   time                                  HEP/ pt ed 5' 10'                                                                                         HEP- pendulums, cane FF/ABD/ER, pulleys, table slides 3-5x day     Modalities          Ice post 10' @ home           Ice/MH ES prn NT 10' [address]

## 2020-01-30 ENCOUNTER — APPOINTMENT (OUTPATIENT)
Dept: PHYSICAL THERAPY | Age: 50
End: 2020-01-30
Payer: COMMERCIAL

## 2020-01-30 ENCOUNTER — OFFICE VISIT (OUTPATIENT)
Dept: PHYSICAL THERAPY | Age: 50
End: 2020-01-30
Payer: COMMERCIAL

## 2020-01-30 DIAGNOSIS — G89.29 CHRONIC RIGHT SHOULDER PAIN: Primary | ICD-10-CM

## 2020-01-30 DIAGNOSIS — M25.511 CHRONIC RIGHT SHOULDER PAIN: Primary | ICD-10-CM

## 2020-01-30 DIAGNOSIS — M25.511 PAIN IN JOINT OF RIGHT SHOULDER: ICD-10-CM

## 2020-01-30 PROCEDURE — 97140 MANUAL THERAPY 1/> REGIONS: CPT | Performed by: PHYSICAL THERAPIST

## 2020-01-30 PROCEDURE — 97110 THERAPEUTIC EXERCISES: CPT | Performed by: PHYSICAL THERAPIST

## 2020-01-30 PROCEDURE — 97014 ELECTRIC STIMULATION THERAPY: CPT | Performed by: PHYSICAL THERAPIST

## 2020-01-30 NOTE — PROGRESS NOTES
Daily Note     Today's date: 2020  Patient name: Herbert Ferrari  : 1970  MRN: [id number]  Referring provider: Petty Anne MD  Dx:   Encounter Diagnosis     ICD-10-CM    1  Chronic right shoulder pain M25 511     G89 29    2  Pain in joint of right shoulder M25 511        Start Time: 1030  Stop Time: 1135  Total time in clinic (min): 65 minutes    Subjective:Patient reports her arm was tingling this am, she took pain meds and feeling better now  Objective: See treatment diary below      Assessment: Tolerated treatment well  Patient would benefit from continued PT pain end range all motions with stretching  Patient has increased function compared to prior to surgery  Loose end feel and PROM doing well  Patient weak and painful with AROM  Plan: Continue per plan of care        Precautions: standard      Manual         Right shoulder, UE 15 20' 20 20 20 20                                                               Exercise Diary         Pendulums CW/CCW 10x ea 20x 30x 30x 30x        Cane FF 10x assist  hold cane 20x A 30x 30x 30x 30x Indep       Cane ABD 10x A 20x A 30x 30x 30x 30x Indep       Cane ER 10x A 20x 30x 30x 30x 30x indep       Cane CP  20x A 30x 30x 30x 30x indep                    Ball closed chain FF with trunk lean 10x  blue 30x 30x 30x 30x 30x       Ball closed chain circles CW/CCW 10xea blue 30x 30x 30x 30x 30x       UBE   4' 5' 6' 6'       puleys FF/POS 10x ea 30x 20x 30x 30x 30x       ladder   3x 5x NT 2° time 5x       RTC isos 6planes    15x NT  time nt       Tband row    30x NT   time 30x       RTC      15x red                    HEP/ pt ed 5' 10'                                                                                         HEP- pendulums, cane FF/ABD/ER, pulleys, table slides 3-5x day     Modalities         Ice post 10' @ home    10'       Ice/MH ES prn NT 10' 10 [facility] [address] [facility] [facility]

## 2020-02-03 ENCOUNTER — OFFICE VISIT (OUTPATIENT)
Dept: PHYSICAL THERAPY | Age: 50
End: 2020-02-03
Payer: COMMERCIAL

## 2020-02-03 DIAGNOSIS — M25.511 PAIN IN JOINT OF RIGHT SHOULDER: ICD-10-CM

## 2020-02-03 DIAGNOSIS — M25.511 CHRONIC RIGHT SHOULDER PAIN: Primary | ICD-10-CM

## 2020-02-03 DIAGNOSIS — G89.29 CHRONIC RIGHT SHOULDER PAIN: Primary | ICD-10-CM

## 2020-02-03 PROCEDURE — 97014 ELECTRIC STIMULATION THERAPY: CPT | Performed by: PHYSICAL THERAPIST

## 2020-02-03 PROCEDURE — 97140 MANUAL THERAPY 1/> REGIONS: CPT | Performed by: PHYSICAL THERAPIST

## 2020-02-03 PROCEDURE — 97110 THERAPEUTIC EXERCISES: CPT | Performed by: PHYSICAL THERAPIST

## 2020-02-03 NOTE — PROGRESS NOTES
Daily Note     Today's date: 2/3/2020  Patient name: Ok Torre  : 1970  MRN: [id number]  Referring provider: Geoffrey Vides MD  Dx:   Encounter Diagnosis     ICD-10-CM    1  Chronic right shoulder pain M25 511     G89 29    2  Pain in joint of right shoulder M25 511        Start Time: 1030  Stop Time: 1130  Total time in clinic (min): 60 minutes    Subjective: Pt reports pain 4/10  Pt is using pain meds prior to Pt  She does report over the weekend she used ibuprofen and it helped relief some of the sharp arm pain  Pt can now use right arm for self cleansing  Objective: See treatment diary below  See measurements below  Assessment: Tolerated treatment well  Patient making good progress  Increased functional use of the right UE  Pt able to raise arm above shoulder height without compensatory shrug  Plan: Continue per plan of care         Precautions: standard      Manual   2/3      Right shoulder, UE 15 20' 20 20 20 20 20                   FF  ABD  ER  IR       170  170  80  55                                      Exercise Diary   2/3      Pendulums CW/CCW 10x ea 20x 30x 30x 30x 30 30      Cane FF 10x assist  hold cane 20x A 30x 30x 30x 30x Indep 30x      Cane ABD 10x A 20x A 30x 30x 30x 30x Indep 30x      Cane ER 10x A 20x 30x 30x 30x 30x indep 30x      Cane CP  20x A 30x 30x 30x 30x indep 30x                   Ball closed chain FF with trunk lean 10x  blue 30x 30x 30x 30x 30x 30x      Ball closed chain circles CW/CCW 10xea blue 30x 30x 30x 30x 30x 30x      UBE   4' 5' 6' 6' 6'      puleys FF/POS 10x ea 30x 20x 30x 30x 30x 30x      ladder   3x 5x NT 2° time 5x 5x      RTC isos 6planes    15x NT  time nt       Tband row    30x NT   time 30x 30x       RTC      15x red 20x red                   HEP/ pt ed 5' 10'                                                                                         HEP- pendulums, cane FF/ABD/ER, anurag, table slides 3-5x day     Modalities  1/22 1/23 1/24 1/27 1/29 1/30 2/3      Ice post 10' @ home    10'       Ice/ CL prn NT 10' 10 [facility] [address] 10 [address] 10' [facility] 10

## 2020-02-04 ENCOUNTER — APPOINTMENT (OUTPATIENT)
Dept: PHYSICAL THERAPY | Age: 50
End: 2020-02-04
Payer: COMMERCIAL

## 2020-02-05 NOTE — PROGRESS NOTES
Daily Note     Today's date: 2020  Patient name: Gisselle De La Rosa  : 1970  MRN: [id number]  Referring provider: Cruzito Piper MD  Dx:   Encounter Diagnosis     ICD-10-CM    1  Chronic right shoulder pain M25 511     G89 29    2  Pain in joint of right shoulder M25 511        Start Time: 1115  Stop Time: 1215  Total time in clinic (min): 60 minutes    Subjective: Patient reports getting better, was able to use an elliptical yesterday with holding on to the handles up higher without pain  She does report  using ibuprofen and it is helping relief some of the sharp arm pain  Objective: See treatment diary below      Assessment: Tolerated treatment well, continues to have pain with end ranges all planes sherlyn with IR/ER  PROM and strength improving  Patient exhibited good technique with therapeutic exercises and would benefit from continued PT      Plan: Continue per plan of care        Precautions: standard      Manual   2/3 2/6     Right shoulder, UE 15 20' 20 20 20 20 20 20                  FF  ABD  ER  IR       170  170  80  55   170  175  80  58                                   Exercise Diary   2/3 2/6     Pendulums CW/CCW 10x ea 20x 30x 30x 30x 30 30 30x     Cane FF 10x assist  hold cane 20x A 30x 30x 30x 30x Indep 30x 30x     Cane ABD 10x A 20x A 30x 30x 30x 30x Indep 30x 30x     Cane ER 10x A 20x 30x 30x 30x 30x indep 30x 30x     Cane CP  20x A 30x 30x 30x 30x indep 30x 30x                  Ball closed chain FF with trunk lean 10x  blue 30x 30x 30x 30x 30x 30x 30x     Ball closed chain circles CW/CCW 10xea blue 30x 30x 30x 30x 30x 30x 30x     UBE   4' 5' 6' 6' 6' 6'     puleys FF/POS 10x ea 30x 20x 30x 30x 30x 30x 30x     ladder   3x 5x NT 2° time 5x 5x 5x     RTC isos 6planes    15x NT  time nt       Tband row    30x NT   time 30x 30x  30x     RTC      15x red 20x red 20x ea                  HEP/ pt ed 5' 10' HEP- pendulums, cane FF/ABD/ER, pulleys, table slides 3-5x day     Modalities  1/22 1/23 1/24 1/27 1/29 1/30 2/3 2/6     Ice post 10' @ home    10'       Ice/ ES prn NT 10' 10 [facility] [address] 10' [facility] 10'

## 2020-02-06 ENCOUNTER — OFFICE VISIT (OUTPATIENT)
Dept: PHYSICAL THERAPY | Age: 50
End: 2020-02-06
Payer: COMMERCIAL

## 2020-02-06 DIAGNOSIS — M25.511 PAIN IN JOINT OF RIGHT SHOULDER: ICD-10-CM

## 2020-02-06 DIAGNOSIS — G89.29 CHRONIC RIGHT SHOULDER PAIN: Primary | ICD-10-CM

## 2020-02-06 DIAGNOSIS — M25.511 CHRONIC RIGHT SHOULDER PAIN: Primary | ICD-10-CM

## 2020-02-06 PROCEDURE — 97014 ELECTRIC STIMULATION THERAPY: CPT

## 2020-02-06 PROCEDURE — 97140 MANUAL THERAPY 1/> REGIONS: CPT

## 2020-02-06 PROCEDURE — 97110 THERAPEUTIC EXERCISES: CPT

## 2020-02-10 ENCOUNTER — OFFICE VISIT (OUTPATIENT)
Dept: PHYSICAL THERAPY | Age: 50
End: 2020-02-10
Payer: COMMERCIAL

## 2020-02-10 DIAGNOSIS — G89.29 CHRONIC RIGHT SHOULDER PAIN: Primary | ICD-10-CM

## 2020-02-10 DIAGNOSIS — M25.511 CHRONIC RIGHT SHOULDER PAIN: Primary | ICD-10-CM

## 2020-02-10 DIAGNOSIS — M25.511 PAIN IN JOINT OF RIGHT SHOULDER: ICD-10-CM

## 2020-02-10 PROCEDURE — 97140 MANUAL THERAPY 1/> REGIONS: CPT | Performed by: PHYSICAL THERAPIST

## 2020-02-10 PROCEDURE — 97110 THERAPEUTIC EXERCISES: CPT | Performed by: PHYSICAL THERAPIST

## 2020-02-10 PROCEDURE — 97014 ELECTRIC STIMULATION THERAPY: CPT | Performed by: PHYSICAL THERAPIST

## 2020-02-10 NOTE — PROGRESS NOTES
Daily Note     Today's date: 2/10/2020  Patient name: Soraya Skelton  : 1970  MRN: [id number]  Referring provider: Lawton Councilman, MD  Dx:   Encounter Diagnosis     ICD-10-CM    1  Chronic right shoulder pain M25 511     G89 29    2  Pain in joint of right shoulder M25 511        Start Time: 1630  Stop Time: 1720  Total time in clinic (min): 50 minutes    Subjective: Pain 3/10, she did c/o stiffness yesterday because she was busy the day prior and could not get her exercises in, but yesterday did them several times and today and she is feels better  Pt saw Dr Jessenia Wheeler who was pleased and stated she may not get full IR behind her back  Objective: See treatment diary below      Assessment: Tolerated treatment well  Patient ROM holding steady with minmal pain at end range  Pt needs strengthening above 90° elevation  Plan: Continue per plan of care  Continue PT 2 weeks then possible d/c to fitness       Precautions: standard      Manual  1/22 1/23 1/24 1/27 1/29 1/30 2/3 2/6 2/10    Right shoulder, UE 15 20' 20 20 20 20 20 20 15                 FF  ABD  ER  IR       170  170  80  55   170  175  80  58                                   Exercise Diary  1/22 1/23 1/24 1/27 1/29 1/30 2/3 2/6 2/10    Pendulums CW/CCW 10x ea 20x 30x 30x 30x 30 30 30x NT    Cane FF 10x assist  hold cane 20x A 30x 30x 30x 30x Indep 30x 30x 30x    Cane ABD 10x A 20x A 30x 30x 30x 30x Indep 30x 30x 30x    Cane ER 10x A 20x 30x 30x 30x 30x indep 30x 30x 30x    Cane CP  20x A 30x 30x 30x 30x indep 30x 30x 30x                 Ball closed chain FF with trunk lean 10x  blue 30x 30x 30x 30x 30x 30x 30x 30x    Ball closed chain circles CW/CCW 10xea blue 30x 30x 30x 30x 30x 30x 30x 30x ea    SA press/CW/  CCW         30x ea  red    UBE   4' 5' 6' 6' 6' 6' 6'    pulleys FF/POS 10x ea 30x 20x 30x 30x 30x 30x 30x 30x    ladder   3x 5x NT 2° time 5x 5x 5x 5x    RTC isos 6planes    15x NT  time nt       Tband row    30x NT   time 30x 30x 30x 30x    RTC      15x red 20x red 20x ea 20x                 HEP/ pt ed 5' 10'                                                                                         HEP- pendulums, cane FF/ABD/ER, pulleys, table slides 3-5x day     Modalities  1/22 1/23 1/24 1/27 1/29 1/30 2/3 2/6     Ice post 10' @ home    10'       Ice/ ES prn NT 10' 10  [address] 10' [facility] [address]'

## 2020-02-12 ENCOUNTER — TRANSCRIBE ORDERS (OUTPATIENT)
Dept: PHYSICAL THERAPY | Age: 50
End: 2020-02-12

## 2020-02-12 ENCOUNTER — OFFICE VISIT (OUTPATIENT)
Dept: PHYSICAL THERAPY | Age: 50
End: 2020-02-12
Payer: COMMERCIAL

## 2020-02-12 DIAGNOSIS — G89.29 CHRONIC RIGHT SHOULDER PAIN: Primary | ICD-10-CM

## 2020-02-12 DIAGNOSIS — M25.511 RIGHT SHOULDER PAIN, UNSPECIFIED CHRONICITY: Primary | ICD-10-CM

## 2020-02-12 DIAGNOSIS — M25.511 PAIN IN JOINT OF RIGHT SHOULDER: ICD-10-CM

## 2020-02-12 DIAGNOSIS — M25.511 CHRONIC RIGHT SHOULDER PAIN: Primary | ICD-10-CM

## 2020-02-12 PROCEDURE — 97014 ELECTRIC STIMULATION THERAPY: CPT | Performed by: PHYSICAL THERAPIST

## 2020-02-12 PROCEDURE — 97110 THERAPEUTIC EXERCISES: CPT | Performed by: PHYSICAL THERAPIST

## 2020-02-12 PROCEDURE — 97140 MANUAL THERAPY 1/> REGIONS: CPT | Performed by: PHYSICAL THERAPIST

## 2020-02-12 NOTE — PROGRESS NOTES
PT Re-Evaluation     Today's date: 2020  Patient name: Yazan Reyes  : 1970  MRN: [id number]  Referring provider: Anish Arevalo MD  Dx:   Encounter Diagnosis     ICD-10-CM    1  Chronic right shoulder pain M25 511     G89 29    2  Pain in joint of right shoulder M25 511        Start Time: 925  Stop Time: 102  Total time in clinic (min): 55 minutes    Assessment  Assessment details: Yazan Reyes has had 10 sessions of physical therapy to date s/p right shoulder manipulation  Her PROM is [facility] but does have limited IR  Her strength remains deficient with elevation overhead  Her function is improving and she is now able to tie her hair back and perform self care without difficulty  Her FOTO score has significantly improved  At this time, Agustin Carter has made good progress towards her goals but lacks some strength for functional activities  She will benefit from skilled services to continue to maximize strength and function  Impairments: abnormal or restricted ROM, activity intolerance, impaired physical strength, lacks appropriate home exercise program and pain with function  Functional limitations: can not use right arm for functional activities, immoblized in sling due to nerve block, pt is right handedUnderstanding of Dx/Px/POC: good   Prognosis: good    Goals  ST-3 WEEKS  1  Decrease pain to < 5/10 at its worst on VAS  Achieved  2  Increase ROM of the right shoulder equivalent to left  ROM is WFL  3  Increase UE by 1/2 MMT grade in all deficient planes  Strength remains deficient  4  Independent HEP for daily performance  AChieved    LT-6 WEEKS  1  Patient to be independent with adls using right hand including combing her hair and self cleansing/toileting  Achieved  2  Full A/PROM of the right shoulder  Full PROM with IR WFL, AROM>90 remians weak/  3  Strength 4/5 or greater      Plan  Plan details: Pt will continue for 2-3 weeks but likely discharge to fitness program due to large copay  Patient would benefit from: skilled physical therapy  Planned modality interventions: cryotherapy, thermotherapy: hydrocollator packs and unattended electrical stimulation  Planned therapy interventions: functional ROM exercises, home exercise program, joint mobilization, manual therapy, neuromuscular re-education, patient education, strengthening, stretching, therapeutic activities and therapeutic exercise  Frequency: 5x/week for 2 weeks, then 2-3x week  Duration in weeks: 3  Plan of Care beginning date: 2020  Plan of Care expiration date: 2020  Treatment plan discussed with: patient        Subjective Evaluation    History of Present Illness  Mechanism of injury: Pt had right shoulder pain for several months but became more constant 2019  She had prior PT without relief  Pt was then referred to a neurosurgeon who felt she needed a cervical disc replacement  She underwent surgery   Post surgery no relief for right shoulder pain  She was then referred to Dr Danyel Stevenson for a second opinion, He felt hte pain ws being caused by her shoulder  She then saw Dr Ten Izaguirre who diagnosed her with adhesive capsulitis  She underwent manipulation or the right shoulder today  She is to have PT daily for 2 weeks and then returns to Dr Ten Izaguirre 2020  Pt reports her pain is decreased to 2/10 but will occasionally raise to 5/10 while trying to tie up hair or reaching to the side  She is now able to shave her underarm and and can tie her up  She is able to dress easier and perfrom house cleaning duties    Pain  Current pain ratin  At worst pain ratin (improved from 8/10)  Location: right sholder radiates to elbow  Quality: radiating and sharp  Progression: improved    Hand dominance: right    Treatments  Previous treatment: physical therapy  Patient Goals  Patient goals for therapy: decreased pain, increased motion, increased strength and independence with ADLs/IADLs  Patient goal: increase        Objective     Neurological Testing     Additional Neurological Details  No neuro signs    Active Range of Motion     Right Shoulder   Flexion: 150 degrees   Abduction: 120 degrees     Passive Range of Motion   Left Shoulder   Flexion: 176 degrees   Abduction: 175 degrees   External rotation 90°: 87 degrees   Internal rotation 90°: 62 degrees     Right Shoulder   Flexion: 170 degrees   Abduction: 175 degrees   External rotation 90°: 90 degrees   Internal rotation 90°: 52 degrees     Additional Passive Range of Motion Details  Loss or IR compared to IE (nerve block), physician has stated she may never reach behind her mid back    Strength/Myotome Testing     Left Shoulder   Normal muscle strength    Right Shoulder     Planes of Motion   Flexion: 3+   Abduction: 3+   External rotation at 0°: 4-   Internal rotation at 0°: 4     Additional Strength Details  Weakness in elevation > 90             Precautions: standard      Manual  1/22 1/23 1/24 1/27 1/29 1/30 2/3 2/6 2/10 2/12   Right shoulder, UE 15 20' 20 20 20 20 20 20 15 15                FF  ABD  ER  IR       170  170  80  55   170  175  80  58                                   Exercise Diary  1/22 1/23 1/24 1/27 1/29 1/30 2/3 2/6 2/10 2/12   Pendulums CW/CCW 10x ea 20x 30x 30x 30x 30 30 30x NT    Cane FF 10x assist  hold cane 20x A 30x 30x 30x 30x Indep 30x 30x 30x 30x   Cane ABD 10x A 20x A 30x 30x 30x 30x Indep 30x 30x 30x 30x   Cane ER 10x A 20x 30x 30x 30x 30x indep 30x 30x 30x 30x   Cane CP  20x A 30x 30x 30x 30x indep 30x 30x 30x 30x                Ball closed chain FF with trunk lean 10x  blue 30x 30x 30x 30x 30x 30x 30x 30x 30x   Ball closed chain circles CW/CCW 10xea blue 30x 30x 30x 30x 30x 30x 30x 30x ea 30x   SA press/CW/  CCW         30x ea  red 30x ea red   UBE   4' 5' 6' 6' 6' 6' 6' 6'   pulleys FF/POS 10x ea 30x 20x 30x 30x 30x 30x 30x 30x 30x   ladder   3x 5x NT 2° time 5x 5x 5x 5x 5x   RTC isos 6planes    15x NT  time nt Tband row    30x NT   time 30x 30x  30x 30x 30x   RTC      15x red 20x red 20x ea 20x 30x   Bicep          20#/ 30   Tricep          40#/ 30   [place] 3,4          10x   HEP/ pt ed 5' 10'                                                                                         HEP- pendulums, cane FF/ABD/ER, pulleys, table slides 3-5x day     Modalities  1/22 1/23 1/24 1/27 1/29 1/30 2/3 2/6     Ice post 10' @ home    10'       [facility] ES [facility] NT 10' [facility] [address] [facility]' [facility]'

## 2020-02-12 NOTE — LETTER
2020    [facility] [address] [facility]    Patient: Ok Torre   YOB: 1970   Date of Visit: 2020     Encounter Diagnosis     ICD-10-CM    1  Chronic right shoulder pain M25 511     G89 29    2  Pain in joint of right shoulder M25 511        Dear Dr Dana Fabian:    Thank you for your recent referral of Ok Torre  Please review the attached evaluation summary from [facility] recent visit  Please verify that you agree with the plan of care by signing the attached order  If you have any questions or concerns, please do not hesitate to call  I sincerely appreciate the opportunity to share in the care of one of your patients and hope to have another opportunity to work with you in the near future  Sincerely,    Jose Jackson, PT      Referring Provider:      I certify that I have read the below Plan of Care and certify the need for these services furnished under this plan of treatment while under my care  Juanita Ortiz MD  [facility] 31: [phone]          PT Re-Evaluation     Today's date: 2020  Patient name: Ok Torre  : 1970  MRN: [id number]  Referring provider: Geoffrey Vides MD  Dx:   Encounter Diagnosis     ICD-10-CM    1  Chronic right shoulder pain M25 511     G89 29    2  Pain in joint of right shoulder M25 511        Start Time: 925  Stop Time:   Total time in clinic (min): 55 minutes    Assessment  Assessment details: Ok Torre has had 10 sessions of physical therapy to date s/p right shoulder manipulation  Her PROM is [facility] but does have limited IR  Her strength remains deficient with elevation overhead  Her function is improving and she is now able to tie her hair back and perform self care without difficulty  Her FOTO score has significantly improved   At this time, Jan Ferreira has made good progress towards her goals but lacks some strength for functional activities  She will benefit from skilled services to continue to maximize strength and function  Impairments: abnormal or restricted ROM, activity intolerance, impaired physical strength, lacks appropriate home exercise program and pain with function  Functional limitations: can not use right arm for functional activities, immoblized in sling due to nerve block, pt is right handedUnderstanding of Dx/Px/POC: good   Prognosis: good    Goals  ST-3 WEEKS  1  Decrease pain to < 5/10 at its worst on VAS  Achieved  2  Increase ROM of the right shoulder equivalent to left  ROM is WFL  3  Increase UE by 1/2 MMT grade in all deficient planes  Strength remains deficient  4  Independent HEP for daily performance  AChieved    LT-6 WEEKS  1  Patient to be independent with adls using right hand including combing her hair and self cleansing/toileting  Achieved  2  Full A/PROM of the right shoulder  Full PROM with IR WFL, AROM>90 remians weak/  3  Strength 4/5 or greater  Plan  Plan details: Pt will continue for 2-3 weeks but likely discharge to fitness program due to large copay  Patient would benefit from: skilled physical therapy  Planned modality interventions: cryotherapy, thermotherapy: hydrocollator packs and unattended electrical stimulation  Planned therapy interventions: functional ROM exercises, home exercise program, joint mobilization, manual therapy, neuromuscular re-education, patient education, strengthening, stretching, therapeutic activities and therapeutic exercise  Frequency: 5x/week for 2 weeks, then 2-3x week  Duration in weeks: 3  Plan of Care beginning date: 2020  Plan of Care expiration date: 2020  Treatment plan discussed with: patient        Subjective Evaluation    History of Present Illness  Mechanism of injury: Pt had right shoulder pain for several months but became more constant 2019  She had prior PT without relief   Pt was then referred to a neurosurgeon who felt she needed a cervical disc replacement  She underwent surgery   Post surgery no relief for right shoulder pain  She was then referred to Dr Bassam Reis for a second opinion, He felt hte pain ws being caused by her shoulder  She then saw Dr Chai Kerr who diagnosed her with adhesive capsulitis  She underwent manipulation or the right shoulder today  She is to have PT daily for 2 weeks and then returns to Dr Chai Kerr 2020  Pt reports her pain is decreased to 2/10 but will occasionally raise to 5/10 while trying to tie up hair or reaching to the side  She is now able to shave her underarm and and can tie her up  She is able to dress easier and perfrom house cleaning duties    Pain  Current pain ratin  At worst pain ratin (improved from 8/10)  Location: right sholder radiates to elbow  Quality: radiating and sharp  Progression: improved    Hand dominance: right    Treatments  Previous treatment: physical therapy  Patient Goals  Patient goals for therapy: decreased pain, increased motion, increased strength and independence with ADLs/IADLs  Patient goal: increase        Objective     Neurological Testing     Additional Neurological Details  No neuro signs    Active Range of Motion     Right Shoulder   Flexion: 150 degrees   Abduction: 120 degrees     Passive Range of Motion   Left Shoulder   Flexion: 176 degrees   Abduction: 175 degrees   External rotation 90°:  87 degrees   Internal rotation 90°:  62 degrees     Right Shoulder   Flexion: 170 degrees   Abduction: 175 degrees   External rotation 90°:  90 degrees   Internal rotation 90°:  52 degrees     Additional Passive Range of Motion Details  Loss or IR compared to IE (nerve block), physician has stated she may never reach behind her mid back    Strength/Myotome Testing     Left Shoulder   Normal muscle strength    Right Shoulder     Planes of Motion   Flexion: 3+   Abduction: 3+   External rotation at 0°:  4-   Internal rotation at 0°:  4     Additional Strength Details  Weakness in elevation > 90             Precautions: standard      Manual  1/22 1/23 1/24 1/27 1/29 1/30 2/3 2/6 2/10 2/12   Right shoulder, UE 15 20' 20 20 20 20 20 20 15 15                FF  ABD  ER  IR       170  170  80  55   170  175  80  58                                   Exercise Diary  1/22 1/23 1/24 1/27 1/29 1/30 2/3 2/6 2/10 2/12   Pendulums CW/CCW 10x ea 20x 30x 30x 30x 30 30 30x NT    Cane FF 10x assist  hold cane 20x A 30x 30x 30x 30x Indep 30x 30x 30x 30x   Cane ABD 10x A 20x A 30x 30x 30x 30x Indep 30x 30x 30x 30x   Cane ER 10x A 20x 30x 30x 30x 30x indep 30x 30x 30x 30x   Cane CP  20x A 30x 30x 30x 30x indep 30x 30x 30x 30x                Ball closed chain FF with trunk lean 10x  blue 30x 30x 30x 30x 30x 30x 30x 30x 30x   Ball closed chain circles CW/CCW 10xea blue 30x 30x 30x 30x 30x 30x 30x 30x ea 30x   SA press/CW/  CCW         30x ea  red 30x ea red   UBE   4' 5' 6' 6' 6' 6' 6' 6'   pulleys FF/POS 10x ea 30x 20x 30x 30x 30x 30x 30x 30x 30x   ladder   3x 5x NT 2° time 5x 5x 5x 5x 5x   RTC isos 6planes    15x NT  time nt       Tband row    30x NT   time 30x 30x  30x 30x 30x   RTC      15x red 20x red 20x ea 20x 30x   Bicep          20#/ 30   Tricep          40#/ 30   Blackburns 3,4          10x   HEP/ pt ed 5' 10'                                                                                         HEP- pendulums, cane FF/ABD/ER, pulleys, table slides 3-5x day     Modalities  1/22 1/23 1/24 1/27 1/29 1/30 2/3 2/6     Ice post 10' @ home    10'       Ice/MH ES prn NT 10' [address]' [address] 10'

## 2020-02-17 ENCOUNTER — OFFICE VISIT (OUTPATIENT)
Dept: PHYSICAL THERAPY | Age: 50
End: 2020-02-17
Payer: COMMERCIAL

## 2020-02-17 DIAGNOSIS — G89.29 CHRONIC RIGHT SHOULDER PAIN: Primary | ICD-10-CM

## 2020-02-17 DIAGNOSIS — M25.511 CHRONIC RIGHT SHOULDER PAIN: Primary | ICD-10-CM

## 2020-02-17 DIAGNOSIS — M25.511 PAIN IN JOINT OF RIGHT SHOULDER: ICD-10-CM

## 2020-02-17 PROCEDURE — 97014 ELECTRIC STIMULATION THERAPY: CPT | Performed by: PHYSICAL THERAPIST

## 2020-02-17 PROCEDURE — 97140 MANUAL THERAPY 1/> REGIONS: CPT | Performed by: PHYSICAL THERAPIST

## 2020-02-17 PROCEDURE — 97110 THERAPEUTIC EXERCISES: CPT | Performed by: PHYSICAL THERAPIST

## 2020-02-17 NOTE — PROGRESS NOTES
Daily Note     Today's date: 2020  Patient name: Gisselle De La Rosa  : 1970  MRN: [id number]  Referring provider: Cruzito Piper MD  Dx:   Encounter Diagnosis     ICD-10-CM    1  Chronic right shoulder pain M25 511     G89 29    2  Pain in joint of right shoulder M25 511        Start Time: 1525  Stop Time: 1620  Total time in clinic (min): 55 minutes    Subjective: Pain 3/10, no pain meds or OTC advil  Objective: See treatment diary below      Assessment: Tolerated treatment well  Patient ROM holding steady without any regression  Plan: Continue per plan of care  Potential d/c next visit       Precautions: standard      Manual  2/17 1/23 1/24 1/27 1/29 1/30 2/3 2/6 2/10 2/12   Right shoulder, UE 15 20' 20 20 20 20 20 20 15 15                FF  ABD  ER  IR       170  170  80  55   170  175  80  58                                   Exercise Diary  2/17 1/23 1/24 1/27 1/29 1/30 2/3 2/6 2/10 2/12   Pendulums CW/CCW 10x ea 20x 30x 30x 30x 30 30 30x NT    Cane FF 30x 20x A 30x 30x 30x 30x Indep 30x 30x 30x 30x   Cane ABD  20x A 30x 30x 30x 30x Indep 30x 30x 30x 30x   Cane ER  20x 30x 30x 30x 30x indep 30x 30x 30x 30x   Cane CP 30x 20x A 30x 30x 30x 30x indep 30x 30x 30x 30x   FF/POS 20x        20x 20x   Ball closed chain FF with trunk lean 30x 30x 30x 30x 30x 30x 30x 30x 30x 30x   Ball closed chain circles CW/CCW 30x 30x 30x 30x 30x 30x 30x 30x 30x ea 30x   SA press/CW/  CCW Red  30x ea        30x ea  red 30x ea red   UBE 6  4' 5' 6' 6' 6' 6' 6' 6'   pulleys FF/POS 30x ea 30x 20x 30x 30x 30x 30x 30x 30x 30x   ladder   3x 5x NT 2° time 5x 5x 5x 5x 5x   RTC isos 6planes    15x NT  time nt       Tband row 20#/ 30x   cybex   30x NT   time 30x 30x  30x 30x 30x   RTC 30x     15x red 20x red 20x ea 20x 30x   Bicep 20#/ 30         20#/ 30   Tricep 40#/ 30         40#/ 30   Blackburns 3,4 15x ea         10x   HEP/ pt ed  10' HEP- pendulums, cane FF/ABD/ER, pulleys, table slides 3-5x day     Modalities  2/17 1/23 1/24 1/27 1/29 1/30 2/3 2/6     Ice post 10' @ home    10'       Ice/ ES prn NT 10' [address] [address] 10' [address] 10'

## 2020-02-19 ENCOUNTER — OFFICE VISIT (OUTPATIENT)
Dept: PHYSICAL THERAPY | Age: 50
End: 2020-02-19
Payer: COMMERCIAL

## 2020-02-19 DIAGNOSIS — M25.511 CHRONIC RIGHT SHOULDER PAIN: Primary | ICD-10-CM

## 2020-02-19 DIAGNOSIS — G89.29 CHRONIC RIGHT SHOULDER PAIN: Primary | ICD-10-CM

## 2020-02-19 DIAGNOSIS — M25.511 PAIN IN JOINT OF RIGHT SHOULDER: ICD-10-CM

## 2020-02-19 PROCEDURE — 97110 THERAPEUTIC EXERCISES: CPT | Performed by: PHYSICAL THERAPIST

## 2020-02-19 PROCEDURE — 97014 ELECTRIC STIMULATION THERAPY: CPT | Performed by: PHYSICAL THERAPIST

## 2020-02-19 PROCEDURE — 97140 MANUAL THERAPY 1/> REGIONS: CPT | Performed by: PHYSICAL THERAPIST

## 2020-02-19 NOTE — PROGRESS NOTES
Daily Note     Today's date: 2020  Patient name: Trinh Cameron  : 1970  MRN: [id number]  Referring provider: Christian Lockett MD  Dx:   Encounter Diagnosis     ICD-10-CM    1  Chronic right shoulder pain M25 511     G89 29    2  Pain in joint of right shoulder M25 511        Start Time:   Stop Time: 1350  Total time in clinic (min): 55 minutes    Subjective: Minimal pain with reaching  Objective: See treatment diary below  ROM WFL, see prior note for full details  Assessment: Tolerated treatment well  Patient has made good progress  ROM WFL in all planes  Strength improved  Decreased pain  Goals  ST-3 WEEKS  1  Decrease pain to < 5/10 at its worst on VAS  Achieved  2  Increase ROM of the right shoulder equivalent to left  ROM is WFL  3  Increase UE by 1/2 MMT grade in all deficient planes  Strength remains deficient overhead, but >4/5 below 90  Pt able to lift crate weighing >40#   4  Independent HEP for daily performance  AChieved    LT-6 WEEKS  1  Patient to be independent with adls using right hand including combing her hair and self cleansing/toileting  Achieved  2  Full A/PROM of the right shoulder  Achieved with mild IR limitation that physician stated may not increase  3  Strength 4/5 or greater  Achieved below shoulder level    Plan: Discharge to HEP       Precautions: standard      Manual  2/17 2/19 1/24 1/27 1/29 1/30 2/3 2/6 2/10 2/12   Right shoulder, UE 15 20' 20 20 20 20 20 20 15 15                FF  ABD  ER  IR       170  170  80  55   170  175  80  58                                   Exercise Diary  2/17 2/19 1/24 1/27 1/29 1/30 2/3 2/6 2/10 2/12   Pendulums CW/CCW 10x ea 20x 30x 30x 30x 30 30 30x NT    Cane FF 30x 20x A 30x 30x 30x 30x Indep 30x 30x 30x 30x   Cane ABD  20x A 30x 30x 30x 30x Indep 30x 30x 30x 30x   Cane ER  20x 30x 30x 30x 30x indep 30x 30x 30x 30x   Cane CP 30x 20x A 30x 30x 30x 30x indep 30x 30x 30x 30x   FF/POS 20x        20x 20x   Lear Line closed chain FF with trunk lean 30x 30x 30x 30x 30x 30x 30x 30x 30x 30x   Ball closed chain circles CW/CCW 30x 30x 30x 30x 30x 30x 30x 30x 30x ea 30x   SA press/CW/  CCW Red  30x ea 30x red       30x ea  red 30x ea red   UBE 6 6' 4' 5' 6' 6' 6' 6' 6' 6'   pulleys FF/POS 30x ea 30x 20x 30x 30x 30x 30x 30x 30x 30x   ladder 5x 5x 3x 5x NT 2° time 5x 5x 5x 5x 5x   RTC isos 6planes    15x NT  time nt       Tband row 20#/ 30x   cybex 20#/ 30  30x NT   time 30x 30x  30x 30x 30x   RTC 30x 30x    15x red 20x red 20x ea 20x 30x   Bicep 20#/ 30 20#/ 30        20#/ 30   Tricep 40#/ 30 40#/ 30        40#/ 30   Blackburns 3,4 15x ea 15x        10x   HEP/ pt ed  5 tband                                                                                         HEP- pendulums, cane FF/ABD/ER, pulleys, table slides 3-5x day     Modalities  2/17 2/19 1/24 1/27 1/29 1/30 2/3 2/6     Ice post 10' @ home    10'       Ice/ ES prn NT 10' [address]' [address] 10'